# Patient Record
Sex: FEMALE | URBAN - METROPOLITAN AREA
[De-identification: names, ages, dates, MRNs, and addresses within clinical notes are randomized per-mention and may not be internally consistent; named-entity substitution may affect disease eponyms.]

---

## 2020-01-31 ENCOUNTER — APPOINTMENT (RX ONLY)
Dept: URBAN - METROPOLITAN AREA CLINIC 88 | Facility: CLINIC | Age: 25
Setting detail: DERMATOLOGY
End: 2020-01-31

## 2020-01-31 DIAGNOSIS — L60.3 NAIL DYSTROPHY: ICD-10-CM

## 2020-01-31 PROBLEM — L60.9 NAIL DISORDER, UNSPECIFIED: Status: ACTIVE | Noted: 2020-01-31

## 2020-01-31 PROCEDURE — ? PRESCRIPTION

## 2020-01-31 PROCEDURE — 99202 OFFICE O/P NEW SF 15 MIN: CPT

## 2020-01-31 PROCEDURE — ? COUNSELING

## 2020-01-31 PROCEDURE — ? TREATMENT REGIMEN

## 2020-01-31 PROCEDURE — ? NAIL CLIPPING FOR PATHOLOGY

## 2020-01-31 RX ORDER — BETAMETHASONE VALERATE 1 MG/G
OINTMENT TOPICAL
Qty: 1 | Refills: 0 | Status: ERX | COMMUNITY
Start: 2020-01-31

## 2020-01-31 RX ADMIN — BETAMETHASONE VALERATE: 1 OINTMENT TOPICAL at 00:00

## 2020-01-31 ASSESSMENT — LOCATION SIMPLE DESCRIPTION DERM
LOCATION SIMPLE: LEFT SMALL FINGERNAIL
LOCATION SIMPLE: LEFT INDEX FINGERNAIL

## 2020-01-31 ASSESSMENT — LOCATION DETAILED DESCRIPTION DERM
LOCATION DETAILED: LEFT INDEX FINGERNAIL
LOCATION DETAILED: LEFT SMALL FINGERNAIL
LOCATION DETAILED: LEFT INDEX FINGER LUNULA

## 2020-01-31 ASSESSMENT — LOCATION ZONE DERM: LOCATION ZONE: FINGERNAIL

## 2020-01-31 NOTE — PROCEDURE: TREATMENT REGIMEN
Plan: *Rx was sent in as betamethasone valerate instead of diproprionate. Called pharmacy to change Rx.*
Detail Level: Zone
Initiate Treatment: Keralyt gel under occlusion nightly, apply a thin layer-betamethasone ointment daily

## 2020-02-21 ENCOUNTER — APPOINTMENT (RX ONLY)
Dept: URBAN - METROPOLITAN AREA CLINIC 88 | Facility: CLINIC | Age: 25
Setting detail: DERMATOLOGY
End: 2020-02-21

## 2020-02-21 DIAGNOSIS — L60.3 NAIL DYSTROPHY: ICD-10-CM

## 2020-02-21 DIAGNOSIS — D22 MELANOCYTIC NEVI: ICD-10-CM

## 2020-02-21 PROBLEM — D22.39 MELANOCYTIC NEVI OF OTHER PARTS OF FACE: Status: ACTIVE | Noted: 2020-02-21

## 2020-02-21 PROBLEM — L60.9 NAIL DISORDER, UNSPECIFIED: Status: ACTIVE | Noted: 2020-02-21

## 2020-02-21 PROCEDURE — 99213 OFFICE O/P EST LOW 20 MIN: CPT | Mod: 25

## 2020-02-21 PROCEDURE — ? COUNSELING

## 2020-02-21 PROCEDURE — ? NAIL UNIT BIOPSY

## 2020-02-21 PROCEDURE — ? TREATMENT REGIMEN

## 2020-02-21 PROCEDURE — 11755 BIOPSY NAIL UNIT: CPT

## 2020-02-21 ASSESSMENT — LOCATION DETAILED DESCRIPTION DERM
LOCATION DETAILED: LEFT SMALL FINGERNAIL
LOCATION DETAILED: LEFT INDEX FINGERNAIL
LOCATION DETAILED: RIGHT INFERIOR CENTRAL MALAR CHEEK
LOCATION DETAILED: LEFT INDEX FINGER LUNULA

## 2020-02-21 ASSESSMENT — LOCATION SIMPLE DESCRIPTION DERM
LOCATION SIMPLE: LEFT SMALL FINGERNAIL
LOCATION SIMPLE: LEFT INDEX FINGERNAIL
LOCATION SIMPLE: RIGHT CHEEK

## 2020-02-21 ASSESSMENT — LOCATION ZONE DERM
LOCATION ZONE: FINGERNAIL
LOCATION ZONE: FACE

## 2020-02-21 NOTE — PROCEDURE: NAIL UNIT BIOPSY
Repair Type: None, Secondary Intention
Nail Avulsion Text: The nail plate was undermined, starting at the distal nail fold,  the nail plate from the nail bed. After detaching the nail plate from the nail bed the nail plate was avulsed.
Secondary Intention Text: Following the biopsy the site will heal with secondary intention.
Wound Care: Petrolatum
Biopsy Type: H and E
Pre-Op Text: A tourniquet was applied to the proximal digit and then the site was prepped.
Partial Removal And Replacement Of Nail Plate Text: The nail plate was partially removed to expose the underlying lesion. The nail plate was then replaced at the end of the procedure.
X Size Of Lesion In Cm (Optional): 0
Hemostasis: None
Bill 53958 For Specimen Handling/Conveyance To Laboratory?: no
Nail Matrix Exploration Text: The nail matrix was exposed by making releasing incisions, with a 15 blade scalpel, in the proximal nail fold. The proximal nail fold was then retracted to expose the nail matrix. Following the procedure the nail fold was replaced and sutured.
Nail Bed Repair Text: Following the biopsy the nail bed was repaired.
Detail Level: Detailed
Partial Nail Avulsion Text: An incision was made in the nail plate. The nail plate was undermined, starting at the distal nail fold,  the nail plate from the nail bed. After detaching the nail plate from the nail bed the a portion of the nail plate was avulsed.
Punch Size In Mm: 3
Simple Repair Text: Following the biopsy the surgical site was closed primarily.
Epidermal Sutures: 4-0 Ethilon
Post-Care Instructions: I reviewed with the patient in detail post-care instructions. Patient is to keep the biopsy site dry overnight, and then apply bacitracin twice daily until healed. Patient may apply hydrogen peroxide soaks to remove any crusting.
Anesthesia Volume In Cc: 0.5
Biopsy Technique: shave biopsy
Shave Biopsy Method: scissors
Consent: Written consent was obtained and risks were reviewed including but not limited to scarring, infection, bleeding, scabbing, incomplete removal, nerve damage and allergy to anesthesia.
Partial Removal Of Nail Plate Text: The nail plate was partially removed to expose the underlying lesion.
Reconstruction Of Nail Bed With Graft Text: Following the biopsy the nail bed was repaired with a skin graft.
Billing Type: Third-Party Bill
Anesthesia Type: 1% lidocaine with epinephrine
Suture Removal: 7 days
Notification Instructions: Patient will be notified of biopsy results. However, patient instructed to call the office if not contacted within 2 weeks.

## 2020-02-21 NOTE — PROCEDURE: TREATMENT REGIMEN
Detail Level: Zone
Plan: Discussed that’s last specimen didn’t make it to the lab (). Pt agree to redo nail bx today. Recommend to continue keralyt gel and discontinue betamethasone

## 2020-03-06 ENCOUNTER — APPOINTMENT (RX ONLY)
Dept: URBAN - METROPOLITAN AREA CLINIC 88 | Facility: CLINIC | Age: 25
Setting detail: DERMATOLOGY
End: 2020-03-06

## 2020-03-06 DIAGNOSIS — L40.8 OTHER PSORIASIS: ICD-10-CM

## 2020-03-06 PROCEDURE — ? PATHOLOGY DISCUSSION

## 2020-03-06 PROCEDURE — ? COUNSELING

## 2020-03-06 PROCEDURE — 99213 OFFICE O/P EST LOW 20 MIN: CPT

## 2020-03-06 PROCEDURE — ? TREATMENT REGIMEN

## 2020-03-06 ASSESSMENT — LOCATION DETAILED DESCRIPTION DERM: LOCATION DETAILED: LEFT INDEX FINGERNAIL

## 2020-03-06 ASSESSMENT — LOCATION SIMPLE DESCRIPTION DERM: LOCATION SIMPLE: LEFT INDEX FINGERNAIL

## 2020-03-06 ASSESSMENT — LOCATION ZONE DERM: LOCATION ZONE: FINGERNAIL

## 2020-03-06 NOTE — PROCEDURE: TREATMENT REGIMEN
Plan: Recommend to apply betamethasone oint and wrap it at night.
Action 1: Continue
Detail Level: Zone
Continue Regimen: Betamethasone oint

## 2023-08-01 ENCOUNTER — OFFICE VISIT (OUTPATIENT)
Dept: PRIMARY CARE | Facility: CLINIC | Age: 28
End: 2023-08-01
Payer: COMMERCIAL

## 2023-08-01 VITALS
DIASTOLIC BLOOD PRESSURE: 80 MMHG | WEIGHT: 132 LBS | HEART RATE: 75 BPM | OXYGEN SATURATION: 98 % | SYSTOLIC BLOOD PRESSURE: 110 MMHG | TEMPERATURE: 97.1 F

## 2023-08-01 DIAGNOSIS — L27.0 ALLERGIC DRUG RASH: ICD-10-CM

## 2023-08-01 DIAGNOSIS — Z00.00 WELLNESS EXAMINATION: Primary | ICD-10-CM

## 2023-08-01 DIAGNOSIS — L40.9 PSORIASIS OF NAIL: ICD-10-CM

## 2023-08-01 DIAGNOSIS — M25.562 ACUTE PAIN OF LEFT KNEE: ICD-10-CM

## 2023-08-01 PROBLEM — L25.9 CONTACT DERMATITIS: Status: ACTIVE | Noted: 2023-08-01

## 2023-08-01 PROCEDURE — 99395 PREV VISIT EST AGE 18-39: CPT | Performed by: FAMILY MEDICINE

## 2023-08-01 PROCEDURE — 99213 OFFICE O/P EST LOW 20 MIN: CPT | Performed by: FAMILY MEDICINE

## 2023-08-01 PROCEDURE — 1036F TOBACCO NON-USER: CPT | Performed by: FAMILY MEDICINE

## 2023-08-01 RX ORDER — CEPHALEXIN 500 MG/1
500 CAPSULE ORAL EVERY 6 HOURS
COMMUNITY
Start: 2023-07-28 | End: 2023-08-01 | Stop reason: ALTCHOICE

## 2023-08-01 RX ORDER — DICLOXACILLIN SODIUM 500 MG/1
500 CAPSULE ORAL EVERY 6 HOURS
COMMUNITY
Start: 2023-07-26 | End: 2023-08-01 | Stop reason: ALTCHOICE

## 2023-08-01 ASSESSMENT — ENCOUNTER SYMPTOMS
SHORTNESS OF BREATH: 0
WEAKNESS: 0
APPETITE CHANGE: 0
ADENOPATHY: 0
SLEEP DISTURBANCE: 0
DIARRHEA: 0
SINUS PRESSURE: 0
SORE THROAT: 0
NAUSEA: 0
FEVER: 0
FREQUENCY: 0
RHINORRHEA: 0
ABDOMINAL PAIN: 0
ARTHRALGIAS: 0
COUGH: 0
FATIGUE: 0
DYSURIA: 0
NERVOUS/ANXIOUS: 0
CHILLS: 0
CONSTIPATION: 0
DIZZINESS: 0
MYALGIAS: 0
VOMITING: 0

## 2023-08-01 NOTE — PROGRESS NOTES
Subjective   Patient ID: Osmin De Luna is a 27 y.o. female who presents for Allergic Reaction (Discuss allergies, discuss referral).    Osmin presents to discuss rash and knee pain.She is also due for wellness exam.     Has had two episodes of mastitis since giving birth in June. Was given rx for dicloxacillin and developed rash. Also was taking sunflower lechitin. Rahs got better after stopping dicloxacillin.     Also having pain in left knee. No injury. Pain on inside of left knee. Hurts when sleeping and resting knees against each other. No knee instability.            Review of Systems   Constitutional:  Negative for appetite change, chills, fatigue and fever.   HENT:  Negative for congestion, ear pain, postnasal drip, rhinorrhea, sinus pressure and sore throat.    Eyes:  Negative for visual disturbance.   Respiratory:  Negative for cough and shortness of breath.    Cardiovascular:  Negative for chest pain.   Gastrointestinal:  Negative for abdominal pain, constipation, diarrhea, nausea and vomiting.   Genitourinary:  Negative for dysuria and frequency.   Musculoskeletal:  Negative for arthralgias and myalgias.   Skin:  Negative for rash.   Neurological:  Negative for dizziness and weakness.   Hematological:  Negative for adenopathy.   Psychiatric/Behavioral:  Negative for sleep disturbance. The patient is not nervous/anxious.        Objective   /80   Pulse 75   Temp 36.2 °C (97.1 °F)   Wt 59.9 kg (132 lb)   SpO2 98%     Physical Exam  Constitutional:       General: She is not in acute distress.     Appearance: Normal appearance.   HENT:      Head: Normocephalic.      Nose: No congestion.      Mouth/Throat:      Mouth: Mucous membranes are moist.   Eyes:      Extraocular Movements: Extraocular movements intact.      Conjunctiva/sclera: Conjunctivae normal.   Cardiovascular:      Rate and Rhythm: Normal rate and regular rhythm.      Heart sounds: No murmur heard.  Pulmonary:      Effort: Pulmonary  effort is normal.      Breath sounds: No wheezing or rhonchi.   Abdominal:      Palpations: Abdomen is soft.      Tenderness: There is no abdominal tenderness.   Musculoskeletal:         General: No swelling or tenderness.      Left knee: No swelling, effusion or erythema. Normal range of motion. No LCL laxity, MCL laxity, ACL laxity or PCL laxity.Normal meniscus and normal patellar mobility.   Skin:     General: Skin is warm and dry.   Neurological:      General: No focal deficit present.      Mental Status: She is alert.   Psychiatric:         Mood and Affect: Mood normal.         Behavior: Behavior normal.         Assessment/Plan   Diagnoses and all orders for this visit:  Wellness examination  Comments:  Reviewed vaccines. Pap up-to-date.  Allergic drug rash  Comments:  Dicloxacillin added to allergy list  Psoriasis of nail  Comments:  [facility] referral.  Acute pain of left knee  Comments:  Continue exercises. If persistent pain, plan XR and PT.

## 2023-08-24 ENCOUNTER — TELEPHONE (OUTPATIENT)
Dept: PRIMARY CARE | Facility: CLINIC | Age: 28
End: 2023-08-24
Payer: COMMERCIAL

## 2023-08-24 NOTE — TELEPHONE ENCOUNTER
Pt called in stating she was seen on 8/1/23 with ELIZABETH and she forgot to ask if she would be willing to place a referral for pt to see a pelvic floor therapist? Please advise, AM

## 2023-09-19 ENCOUNTER — OFFICE VISIT (OUTPATIENT)
Dept: PRIMARY CARE | Facility: CLINIC | Age: 28
End: 2023-09-19
Payer: COMMERCIAL

## 2023-09-19 VITALS
OXYGEN SATURATION: 98 % | HEART RATE: 80 BPM | DIASTOLIC BLOOD PRESSURE: 70 MMHG | TEMPERATURE: 97.2 F | SYSTOLIC BLOOD PRESSURE: 110 MMHG

## 2023-09-19 DIAGNOSIS — R51.9 ACUTE NONINTRACTABLE HEADACHE, UNSPECIFIED HEADACHE TYPE: ICD-10-CM

## 2023-09-19 DIAGNOSIS — R09.81 NASAL CONGESTION: ICD-10-CM

## 2023-09-19 DIAGNOSIS — R50.9 FEVER, UNSPECIFIED FEVER CAUSE: Primary | ICD-10-CM

## 2023-09-19 DIAGNOSIS — R10.9 ABDOMINAL CRAMPING: ICD-10-CM

## 2023-09-19 PROCEDURE — 99213 OFFICE O/P EST LOW 20 MIN: CPT | Performed by: FAMILY MEDICINE

## 2023-09-19 PROCEDURE — 87636 SARSCOV2 & INF A&B AMP PRB: CPT

## 2023-09-19 PROCEDURE — 1036F TOBACCO NON-USER: CPT | Performed by: FAMILY MEDICINE

## 2023-09-19 ASSESSMENT — ENCOUNTER SYMPTOMS
SORE THROAT: 1
HEADACHES: 1
WHEEZING: 0
VOMITING: 0
NAUSEA: 1
FEVER: 1
DIARRHEA: 1
DYSURIA: 0
COUGH: 0
ABDOMINAL PAIN: 1

## 2023-09-19 NOTE — PROGRESS NOTES
Subjective   Patient ID: Osmin De Luna is a 28 y.o. female who presents for Fever (Abd pain, scratchy throat x 1 day).    Osmin has been feeling sick. Febrile. Achy. Slight cough. Having abdominal cramping like about to have diarrhea.     Fever   This is a new problem. The current episode started today. The problem occurs rarely. The problem has been gradually worsening. The maximum temperature noted was 100 to 100.9 F. The temperature was taken using an oral thermometer. Associated symptoms include abdominal pain, chest pain, congestion, diarrhea, ear pain, headaches, muscle aches, nausea, sleepiness and a sore throat. Pertinent negatives include no coughing, rash, urinary pain, vomiting or wheezing.        Review of Systems   Constitutional:  Positive for fever.   HENT:  Positive for congestion, ear pain and sore throat.    Respiratory:  Negative for cough and wheezing.    Cardiovascular:  Positive for chest pain.   Gastrointestinal:  Positive for abdominal pain, diarrhea and nausea. Negative for vomiting.   Genitourinary:  Negative for dysuria.   Skin:  Negative for rash.   Neurological:  Positive for headaches.       Objective   /70   Pulse 80   Temp 36.2 °C (97.2 °F)   SpO2 98%     Physical Exam  Constitutional:       General: She is not in acute distress.     Appearance: She is not toxic-appearing.   HENT:      Right Ear: Tympanic membrane normal.      Left Ear: Tympanic membrane normal.      Nose: Congestion and rhinorrhea present.   Cardiovascular:      Rate and Rhythm: Normal rate and regular rhythm.      Heart sounds: No murmur heard.  Pulmonary:      Effort: No respiratory distress.      Breath sounds: No wheezing or rales.   Abdominal:      Palpations: Abdomen is soft.      Tenderness: There is abdominal tenderness (mild throughout). There is no guarding or rebound.   Neurological:      Mental Status: She is alert.         Assessment/Plan   Diagnoses and all orders for this visit:  Fever,  unspecified fever cause  -     Sars-CoV-2 PCR, Symptomatic; Future  -     Influenza A, and B PCR; Future  Acute nonintractable headache, unspecified headache type  Nasal congestion  Abdominal cramping    Discussed that symptoms most likely from viral infection. Flu and COVID testing sent. Wear mask at home to prevent transmission to infant and other family.

## 2023-09-20 ENCOUNTER — TELEPHONE (OUTPATIENT)
Dept: PRIMARY CARE | Facility: CLINIC | Age: 28
End: 2023-09-20
Payer: COMMERCIAL

## 2023-09-20 LAB
FLU A RESULT: NOT DETECTED
FLU B RESULT: NOT DETECTED
SARS-COV-2 RESULT: NOT DETECTED

## 2024-03-23 ASSESSMENT — ENCOUNTER SYMPTOMS
BELCHING: 0
FREQUENCY: 0
ARTHRALGIAS: 0
ANOREXIA: 0
CONSTIPATION: 0
DYSURIA: 0
HEMATOCHEZIA: 0
MYALGIAS: 1
FLATUS: 0
VOMITING: 0
NAUSEA: 0
FEVER: 0
WEIGHT LOSS: 0
HEADACHES: 0
HEMATURIA: 0
ABDOMINAL PAIN: 1
DIARRHEA: 0

## 2024-03-27 ENCOUNTER — APPOINTMENT (OUTPATIENT)
Dept: PRIMARY CARE | Facility: CLINIC | Age: 29
End: 2024-03-27
Payer: COMMERCIAL

## 2024-08-02 ENCOUNTER — APPOINTMENT (OUTPATIENT)
Dept: PRIMARY CARE | Facility: CLINIC | Age: 29
End: 2024-08-02
Payer: COMMERCIAL